# Patient Record
Sex: MALE | Race: OTHER | HISPANIC OR LATINO | Employment: FULL TIME | URBAN - METROPOLITAN AREA
[De-identification: names, ages, dates, MRNs, and addresses within clinical notes are randomized per-mention and may not be internally consistent; named-entity substitution may affect disease eponyms.]

---
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
## 2021-08-16 ENCOUNTER — OFFICE VISIT (OUTPATIENT)
Dept: URGENT CARE | Facility: CLINIC | Age: 21
End: 2021-08-16
Payer: COMMERCIAL

## 2021-08-16 ENCOUNTER — HOSPITAL ENCOUNTER (OUTPATIENT)
Dept: RADIOLOGY | Facility: MEDICAL CENTER | Age: 21
End: 2021-08-16
Attending: PHYSICIAN ASSISTANT
Payer: COMMERCIAL

## 2021-08-16 VITALS
HEART RATE: 84 BPM | WEIGHT: 280 LBS | RESPIRATION RATE: 16 BRPM | SYSTOLIC BLOOD PRESSURE: 122 MMHG | DIASTOLIC BLOOD PRESSURE: 88 MMHG | OXYGEN SATURATION: 97 % | BODY MASS INDEX: 39.2 KG/M2 | TEMPERATURE: 96.8 F | HEIGHT: 71 IN

## 2021-08-16 DIAGNOSIS — S60.151A SUBUNGUAL HEMATOMA OF RIGHT LITTLE FINGER, INITIAL ENCOUNTER: ICD-10-CM

## 2021-08-16 DIAGNOSIS — S67.10XA CRUSHING INJURY OF FINGER OF RIGHT HAND: ICD-10-CM

## 2021-08-16 PROCEDURE — 73140 X-RAY EXAM OF FINGER(S): CPT | Mod: RT

## 2021-08-16 PROCEDURE — 99203 OFFICE O/P NEW LOW 30 MIN: CPT | Mod: 25 | Performed by: PHYSICIAN ASSISTANT

## 2021-08-16 PROCEDURE — 11740 EVACUATION SUBUNGUAL HMTMA: CPT | Performed by: PHYSICIAN ASSISTANT

## 2021-08-16 RX ORDER — ACETAMINOPHEN 500 MG
500-1000 TABLET ORAL EVERY 6 HOURS PRN
COMMUNITY
End: 2022-11-28

## 2021-08-16 RX ORDER — IBUPROFEN 200 MG
200 TABLET ORAL EVERY 6 HOURS PRN
COMMUNITY
End: 2022-09-19

## 2021-08-16 ASSESSMENT — ENCOUNTER SYMPTOMS
NAUSEA: 0
VOMITING: 0
EYE DISCHARGE: 0
SHORTNESS OF BREATH: 0
SORE THROAT: 0
EYE REDNESS: 0
FEVER: 0
COUGH: 0
HEADACHES: 0

## 2021-08-16 NOTE — LETTER
August 16, 2021         Patient: Devan Tobar   YOB: 2000   Date of Visit: 8/16/2021           To Whom it May Concern:    Devan Tobar was seen in my clinic on 8/16/2021. Please excuse the patient from work 8/16/2021 and 8/17/2021.     If you have any questions or concerns, please don't hesitate to call.        Sincerely,           Areli Cardenas P.A.-C.  Electronically Signed

## 2021-08-16 NOTE — LETTER
August 16, 2021         Patient: Devan Tobar   YOB: 2000   Date of Visit: 8/16/2021           To Whom it May Concern:    Devan Tobar was seen in my clinic on 8/16/2021. Please excuse him from work today, 8/16/2021.       If you have any questions or concerns, please don't hesitate to call.        Sincerely,           Areli Cardenas P.A.-C.  Electronically Signed

## 2021-08-16 NOTE — PROGRESS NOTES
"Subjective     Devan Tobar is a 21 y.o. male who presents with Finger Injury ((R) pinky injury from car door couple days ago)            HPI  This is a new problem.  The patient presents to clinic secondary to an injury to the right fifth digit x2 days ago.  The patient states he accidentally jammed his right pinky in a car door x2 days ago.  The patient states he developed pain and swelling to the distal aspect of the right fifth digit.  The patient reports associated bruising underneath the nail.  The patient states he is experiencing a constant \"throbbing\" pain to the distal aspect of the right fifth digit.  The patient reports no open wounds/abrasions.  The patient notes slight decreased range of motion secondary to pain and swelling.  He reports no numbness, tingling, or weakness.  The patient is taken OTC Tylenol for his current symptoms.    PMH:  has a past medical history of Family problems (6/21/2012).  MEDS:   Current Outpatient Medications:   •  acetaminophen (TYLENOL) 500 MG Tab, Take 500-1,000 mg by mouth every 6 hours as needed., Disp: , Rfl:   •  ibuprofen (MOTRIN) 200 MG Tab, Take 200 mg by mouth every 6 hours as needed., Disp: , Rfl:   •  benzonatate (TESSALON) 200 MG capsule, Take 1 Cap by mouth 3 times a day as needed for Cough. (Patient not taking: Reported on 8/16/2021), Disp: 60 Cap, Rfl: 0  •  Pheniramine-PE-APAP (THERAFLU SINUS & COLD PO), Take  by mouth. (Patient not taking: Reported on 8/16/2021), Disp: , Rfl:   •  Pseudoephedrine-APAP-DM (DAYQUIL PO), Take  by mouth. (Patient not taking: Reported on 8/16/2021), Disp: , Rfl:   •  azithromycin (ZITHROMAX) 250 MG Tab, Take two tabs by mouth on day one, then one tab by mouth daily on days 2-5. (Patient not taking: Reported on 8/16/2021), Disp: 6 Tab, Rfl: 0  ALLERGIES: No Known Allergies  SURGHX: No past surgical history on file.  SOCHX:  reports that he has never smoked. He has never used smokeless tobacco. He reports current alcohol " "use. He reports current drug use. Drugs: Inhaled and Marijuana.  FH: Family history was reviewed, no pertinent findings to report      Review of Systems   Constitutional: Negative for fever.   HENT: Negative for congestion, ear pain and sore throat.    Eyes: Negative for discharge and redness.   Respiratory: Negative for cough and shortness of breath.    Cardiovascular: Negative for chest pain and leg swelling.   Gastrointestinal: Negative for nausea and vomiting.   Musculoskeletal: Negative for joint pain.   Skin: Negative for rash.   Neurological: Negative for headaches.            Objective     /88 (BP Location: Left arm, Patient Position: Sitting, BP Cuff Size: Adult long)   Pulse 84   Temp 36 °C (96.8 °F) (Temporal)   Resp 16   Ht 1.803 m (5' 11\")   Wt (!) 127 kg (280 lb)   SpO2 97%   BMI 39.05 kg/m²      Physical Exam  Constitutional:       General: He is not in acute distress.     Appearance: Normal appearance. He is well-developed. He is not ill-appearing.   HENT:      Head: Normocephalic and atraumatic.      Right Ear: External ear normal.      Left Ear: External ear normal.      Nose: Nose normal.   Eyes:      Conjunctiva/sclera: Conjunctivae normal.   Cardiovascular:      Rate and Rhythm: Normal rate.   Pulmonary:      Effort: Pulmonary effort is normal.   Musculoskeletal:      Cervical back: Normal range of motion and neck supple.      Comments:   Right 5th Digit:  Tenderness to the distal aspect of the right fifth digit, including the DIP joint, with trace localized swelling.  No ecchymosis.  No overlying erythema.  No increased warmth.  No open wounds/abrasions.  The patient's fingernail is intact with a subungual hematoma.  Decreased ROM -the patient measured limited range of motion of the distal aspect of the right fifth digit; the patient presented full active range of motion of the PIP joint  Distally neurovascular intact  Strength 5/5 -flexion/extension of the right fifth digit " against resistance   Skin:     General: Skin is warm and dry.   Neurological:      Mental Status: He is alert and oriented to person, place, and time.            Progress:  Right 5th Finger XR:  XRs reviewed by me.     COMPARISON: None     FINDINGS:  No acute fracture or dislocation of the right fifth finger.     IMPRESSION:  No acute fracture identified.       Reviewed x-ray results with the patient in clinic.    Subungual I&D:  Cleansed with the patient's fingernail with an alcohol prep pad.  Used a cautery pen to puncture a single hole in the center of the perineal of the right fifth digit.  Bloody discharge was expressed.  Hemostasis was achieved.  The patient tolerated the procedure well.  Educated the patient on proper wound care.  Advised the patient to monitor for signs of infection.           Assessment & Plan        1. Crushing injury of finger of right hand  - DX-FINGER(S) 2+ RIGHT; Future    2. Subungual hematoma of right little finger, initial encounter    Differential diagnoses, supportive care, and indications for immediate follow-up discussed with patient.   Instructed to return to clinic or nearest emergency department for any change in condition, further concerns, or worsening of symptoms.    OTC NSAIDs for pain/discomfort   RICE  Epson salt soaks  Keep wound clean and dry  Monitor for signs of infection  Follow-up with PCP   Return to clinic or go tot he ED if symptoms worsen or fail to improve, or if the patient should develop worsening/increasing pain/tenderness, swelling, bruising, redness or warmth to the affected area, discharge/drainage, decreased ROM, numbness, tingling or weakness, difficulty walking, fever/chills, and/or any concerning symptoms.     Discussed plan with the patient, and he agrees to the above.    I personally reviewed prior external notes and test results pertinent to today's visit.  I have independently reviewed and interpreted all diagnostics ordered during this urgent  care visit.     Time spent evaluating this patient was at least 30 minutes and includes preparing for visit, obtaining history, exam and evaluation, ordering labs/tests/procedures/medications, independent interpretation, and counseling/education.    Please note that this dictation was created using voice recognition software. I have made every reasonable attempt to correct obvious errors, but I expect that there may be errors of grammar and possibly content that I did not discover before finalizing the note.     This note was electronically signed by Areli Cardenas PA-C

## 2022-08-24 ENCOUNTER — OFFICE VISIT (OUTPATIENT)
Dept: MEDICAL GROUP | Facility: IMAGING CENTER | Age: 22
End: 2022-08-24
Payer: COMMERCIAL

## 2022-08-24 VITALS
WEIGHT: 254.8 LBS | RESPIRATION RATE: 17 BRPM | HEART RATE: 76 BPM | DIASTOLIC BLOOD PRESSURE: 60 MMHG | SYSTOLIC BLOOD PRESSURE: 102 MMHG | HEIGHT: 72 IN | BODY MASS INDEX: 34.51 KG/M2 | OXYGEN SATURATION: 95 % | TEMPERATURE: 97.8 F

## 2022-08-24 DIAGNOSIS — Z13.31 DEPRESSION SCREENING: ICD-10-CM

## 2022-08-24 DIAGNOSIS — F33.0 MILD EPISODE OF RECURRENT MAJOR DEPRESSIVE DISORDER (HCC): ICD-10-CM

## 2022-08-24 DIAGNOSIS — E66.1 CLASS 1 DRUG-INDUCED OBESITY WITH SERIOUS COMORBIDITY AND BODY MASS INDEX (BMI) OF 34.0 TO 34.9 IN ADULT: ICD-10-CM

## 2022-08-24 DIAGNOSIS — L73.2 HIDRADENITIS SUPPURATIVA: ICD-10-CM

## 2022-08-24 DIAGNOSIS — Z11.59 NEED FOR HEPATITIS C SCREENING TEST: ICD-10-CM

## 2022-08-24 DIAGNOSIS — Z11.3 SCREENING EXAMINATION FOR SEXUALLY TRANSMITTED DISEASE: ICD-10-CM

## 2022-08-24 DIAGNOSIS — Z76.89 ENCOUNTER TO ESTABLISH CARE WITH NEW DOCTOR: ICD-10-CM

## 2022-08-24 PROBLEM — F32.A DEPRESSION: Status: ACTIVE | Noted: 2022-08-24

## 2022-08-24 PROCEDURE — 99204 OFFICE O/P NEW MOD 45 MIN: CPT | Performed by: CLINICAL NURSE SPECIALIST

## 2022-08-24 RX ORDER — CLINDAMYCIN PHOSPHATE 11.9 MG/ML
1 SOLUTION TOPICAL 2 TIMES DAILY
Qty: 60 ML | Refills: 0 | Status: SHIPPED | OUTPATIENT
Start: 2022-08-24

## 2022-08-24 ASSESSMENT — PATIENT HEALTH QUESTIONNAIRE - PHQ9
5. POOR APPETITE OR OVEREATING: 1 - SEVERAL DAYS
CLINICAL INTERPRETATION OF PHQ2 SCORE: 1
SUM OF ALL RESPONSES TO PHQ QUESTIONS 1-9: 7

## 2022-08-24 NOTE — ASSESSMENT & PLAN NOTE
Recently started going to the gym.  Diet is unrestricted.  Drinks soda with sugar.  Alcohol occasionally. No cigarettes or vaping.

## 2022-08-24 NOTE — ASSESSMENT & PLAN NOTE
Devan feels like he has had depression of variable intensity since [employer].  He has had thoughts of not being here but reports he would never act on those thoughts and loves life. Interested in therapy as this has never been addressed previously.

## 2022-08-24 NOTE — PROGRESS NOTES
Subjective     Devan Tobar is a 22 y.o. male who presents with Establish Care, Other (Painful in groin hair on face and pelvis), and Referral Needed (To Mental Health)            HPI  Hidradenitis suppurativa  Affects the face and pubic area. Started after using a razor and has persisted on and off for many years.  Areas can become painful and drain blood and pus. He has never had treatment for this. Switched to electric razor and no new lesions.    Depression  Devan feels like he has had depression of variable intensity since [employer].  He has had thoughts of not being here but reports he would never act on those thoughts and loves life. Interested in therapy as this has never been addressed previously.     Class 1 drug-induced obesity with serious comorbidity and body mass index (BMI) of 34.0 to 34.9 in adult  Recently started going to the gym.  Diet is unrestricted.  Drinks soda with sugar.  Alcohol occasionally. No cigarettes or vaping.    ROS  See HPI     No Known Allergies    Current Outpatient Medications on File Prior to Visit   Medication Sig Dispense Refill    acetaminophen (TYLENOL) 500 MG Tab Take 500-1,000 mg by mouth every 6 hours as needed.      ibuprofen (MOTRIN) 200 MG Tab Take 200 mg by mouth every 6 hours as needed.       No current facility-administered medications on file prior to visit.     Depression Screen (PHQ-2/PHQ-9) 1/11/2018 8/24/2022   PHQ-2 Total Score 0 1   PHQ-9 Total Score - 7       Interpretation of PHQ-9 Total Score   Score Severity   1-4 No Depression   5-9 Mild Depression   10-14 Moderate Depression   15-19 Moderately Severe Depression   20-27 Severe Depression        Objective     /60 (BP Location: Right arm, Patient Position: Sitting, BP Cuff Size: Large adult)   Pulse 76   Temp 36.6 °C (97.8 °F) (Temporal)   Resp 17   Ht 1.829 m (6')   Wt 116 kg (254 lb 12.8 oz)   SpO2 95%   BMI 34.56 kg/m²      Physical Exam  Constitutional:        General: He is not in acute distress.     Appearance: He is obese. He is not ill-appearing, toxic-appearing or diaphoretic.   HENT:      Head: Normocephalic and atraumatic.   Eyes:      Pupils: Pupils are equal, round, and reactive to light.   Cardiovascular:      Rate and Rhythm: Normal rate.   Pulmonary:      Effort: Pulmonary effort is normal.   Skin:     General: Skin is warm and dry.      Comments: Multiple open comedones on face with 2 scabbed cystic lesions.  Pubic area with 2 scabbed lesions as well, no drainage or warmth.  Some reddened tissue throughout.   Neurological:      Mental Status: He is alert and oriented to person, place, and time.   Psychiatric:         Mood and Affect: Mood normal.         Behavior: Behavior normal.         Thought Content: Thought content normal.         Judgment: Judgment normal.                           Assessment & Plan      1. Encounter to establish care with new doctor  Social, personal, surgical, family history reviewed. Will address care gaps at future visit.    2. Depression screening  Positive    3. Hidradenitis suppurativa  Apply clindamycin solution twice daily and return for reevaluation in 2-4 weeks.    - clindamycin (CLEOCIN) 1 % Solution; Apply 1 Application topically 2 times a day.  Dispense: 60 mL; Refill: 0    4. Mild episode of recurrent major depressive disorder (HCC)  Referral to behavioral health placed.  Declined medication.    5. Class 1 drug-induced obesity with serious comorbidity and body mass index (BMI) of 34.0 to 34.9 in adult  Continue exercising. Cut out soda.  Future visits will address healthy lifestyle choices further. Labs ordered.    - CBC WITH DIFFERENTIAL; Future  - Comp Metabolic Panel; Future  - HEMOGLOBIN A1C; Future  - Lipid Profile; Future  - TSH WITH REFLEX TO FT4; Future  - VITAMIN D,25 HYDROXY; Future    6. Screening examination for sexually transmitted disease    - T.PALLIDUM AB EIA; Future  - HIV AG/AB COMBO ASSAY SCREENING;  Future  - Chlamydia/GC, PCR (Urine); Future    7. Need for hepatitis C screening test    - HEP C VIRUS ANTIBODY; Future    Return if symptoms worsen or fail to improve, for With test results.

## 2022-08-24 NOTE — ASSESSMENT & PLAN NOTE
Affects the face and pubic area. Started after using a razor and has persisted on and off for many years.  Areas can become painful and drain blood and pus. He has never had treatment for this. Switched to electric razor and no new lesions.

## 2022-08-25 ENCOUNTER — HOSPITAL ENCOUNTER (OUTPATIENT)
Dept: LAB | Facility: MEDICAL CENTER | Age: 22
End: 2022-08-25
Attending: CLINICAL NURSE SPECIALIST
Payer: COMMERCIAL

## 2022-08-25 DIAGNOSIS — Z11.3 SCREENING EXAMINATION FOR SEXUALLY TRANSMITTED DISEASE: ICD-10-CM

## 2022-08-25 DIAGNOSIS — E66.1 CLASS 1 DRUG-INDUCED OBESITY WITH SERIOUS COMORBIDITY AND BODY MASS INDEX (BMI) OF 34.0 TO 34.9 IN ADULT: ICD-10-CM

## 2022-08-25 DIAGNOSIS — Z11.59 NEED FOR HEPATITIS C SCREENING TEST: ICD-10-CM

## 2022-08-25 LAB
25(OH)D3 SERPL-MCNC: 13 NG/ML (ref 30–100)
ALBUMIN SERPL BCP-MCNC: 5 G/DL (ref 3.2–4.9)
ALBUMIN/GLOB SERPL: 1.7 G/DL
ALP SERPL-CCNC: 107 U/L (ref 30–99)
ALT SERPL-CCNC: 17 U/L (ref 2–50)
ANION GAP SERPL CALC-SCNC: 12 MMOL/L (ref 7–16)
AST SERPL-CCNC: 21 U/L (ref 12–45)
BASOPHILS # BLD AUTO: 0.4 % (ref 0–1.8)
BASOPHILS # BLD: 0.02 K/UL (ref 0–0.12)
BILIRUB SERPL-MCNC: 0.6 MG/DL (ref 0.1–1.5)
BUN SERPL-MCNC: 14 MG/DL (ref 8–22)
CALCIUM SERPL-MCNC: 9.8 MG/DL (ref 8.5–10.5)
CHLORIDE SERPL-SCNC: 105 MMOL/L (ref 96–112)
CHOLEST SERPL-MCNC: 190 MG/DL (ref 100–199)
CO2 SERPL-SCNC: 23 MMOL/L (ref 20–33)
CREAT SERPL-MCNC: 0.77 MG/DL (ref 0.5–1.4)
EOSINOPHIL # BLD AUTO: 0.12 K/UL (ref 0–0.51)
EOSINOPHIL NFR BLD: 2.3 % (ref 0–6.9)
ERYTHROCYTE [DISTWIDTH] IN BLOOD BY AUTOMATED COUNT: 42.6 FL (ref 35.9–50)
EST. AVERAGE GLUCOSE BLD GHB EST-MCNC: 111 MG/DL
FASTING STATUS PATIENT QL REPORTED: NORMAL
GFR SERPLBLD CREATININE-BSD FMLA CKD-EPI: 130 ML/MIN/1.73 M 2
GLOBULIN SER CALC-MCNC: 3 G/DL (ref 1.9–3.5)
GLUCOSE SERPL-MCNC: 90 MG/DL (ref 65–99)
HBA1C MFR BLD: 5.5 % (ref 4–5.6)
HCT VFR BLD AUTO: 46.4 % (ref 42–52)
HCV AB SER QL: NORMAL
HDLC SERPL-MCNC: 28 MG/DL
HGB BLD-MCNC: 16.1 G/DL (ref 14–18)
HIV 1+2 AB+HIV1 P24 AG SERPL QL IA: NORMAL
IMM GRANULOCYTES # BLD AUTO: 0.02 K/UL (ref 0–0.11)
IMM GRANULOCYTES NFR BLD AUTO: 0.4 % (ref 0–0.9)
LDLC SERPL CALC-MCNC: 120 MG/DL
LYMPHOCYTES # BLD AUTO: 1.54 K/UL (ref 1–4.8)
LYMPHOCYTES NFR BLD: 30 % (ref 22–41)
MCH RBC QN AUTO: 31.4 PG (ref 27–33)
MCHC RBC AUTO-ENTMCNC: 34.7 G/DL (ref 33.7–35.3)
MCV RBC AUTO: 90.6 FL (ref 81.4–97.8)
MONOCYTES # BLD AUTO: 0.45 K/UL (ref 0–0.85)
MONOCYTES NFR BLD AUTO: 8.8 % (ref 0–13.4)
NEUTROPHILS # BLD AUTO: 2.98 K/UL (ref 1.82–7.42)
NEUTROPHILS NFR BLD: 58.1 % (ref 44–72)
NRBC # BLD AUTO: 0 K/UL
NRBC BLD-RTO: 0 /100 WBC
PLATELET # BLD AUTO: 187 K/UL (ref 164–446)
PMV BLD AUTO: 11.9 FL (ref 9–12.9)
POTASSIUM SERPL-SCNC: 3.9 MMOL/L (ref 3.6–5.5)
PROT SERPL-MCNC: 8 G/DL (ref 6–8.2)
RBC # BLD AUTO: 5.12 M/UL (ref 4.7–6.1)
SODIUM SERPL-SCNC: 140 MMOL/L (ref 135–145)
T PALLIDUM AB SER QL IA: NORMAL
TRIGL SERPL-MCNC: 208 MG/DL (ref 0–149)
TSH SERPL DL<=0.005 MIU/L-ACNC: 0.67 UIU/ML (ref 0.38–5.33)
WBC # BLD AUTO: 5.1 K/UL (ref 4.8–10.8)

## 2022-08-25 PROCEDURE — 86803 HEPATITIS C AB TEST: CPT

## 2022-08-25 PROCEDURE — 82306 VITAMIN D 25 HYDROXY: CPT

## 2022-08-25 PROCEDURE — 83036 HEMOGLOBIN GLYCOSYLATED A1C: CPT

## 2022-08-25 PROCEDURE — 86780 TREPONEMA PALLIDUM: CPT

## 2022-08-25 PROCEDURE — 80061 LIPID PANEL: CPT

## 2022-08-25 PROCEDURE — 80053 COMPREHEN METABOLIC PANEL: CPT

## 2022-08-25 PROCEDURE — 85025 COMPLETE CBC W/AUTO DIFF WBC: CPT

## 2022-08-25 PROCEDURE — 84443 ASSAY THYROID STIM HORMONE: CPT

## 2022-08-25 PROCEDURE — 87389 HIV-1 AG W/HIV-1&-2 AB AG IA: CPT

## 2022-08-25 PROCEDURE — 36415 COLL VENOUS BLD VENIPUNCTURE: CPT

## 2022-08-25 PROCEDURE — 87591 N.GONORRHOEAE DNA AMP PROB: CPT

## 2022-08-25 PROCEDURE — 87491 CHLMYD TRACH DNA AMP PROBE: CPT

## 2022-08-26 LAB
C TRACH DNA SPEC QL NAA+PROBE: NEGATIVE
N GONORRHOEA DNA SPEC QL NAA+PROBE: NEGATIVE
SPECIMEN SOURCE: NORMAL

## 2022-09-19 ENCOUNTER — OFFICE VISIT (OUTPATIENT)
Dept: MEDICAL GROUP | Facility: IMAGING CENTER | Age: 22
End: 2022-09-19
Payer: COMMERCIAL

## 2022-09-19 VITALS
HEIGHT: 72 IN | TEMPERATURE: 96.7 F | BODY MASS INDEX: 34.81 KG/M2 | DIASTOLIC BLOOD PRESSURE: 64 MMHG | SYSTOLIC BLOOD PRESSURE: 110 MMHG | OXYGEN SATURATION: 96 % | HEART RATE: 60 BPM | RESPIRATION RATE: 16 BRPM | WEIGHT: 257 LBS

## 2022-09-19 DIAGNOSIS — Z23 NEED FOR VACCINATION: ICD-10-CM

## 2022-09-19 DIAGNOSIS — L73.2 HIDRADENITIS SUPPURATIVA: ICD-10-CM

## 2022-09-19 DIAGNOSIS — R53.83 FATIGUE, UNSPECIFIED TYPE: ICD-10-CM

## 2022-09-19 DIAGNOSIS — F33.0 MILD EPISODE OF RECURRENT MAJOR DEPRESSIVE DISORDER (HCC): ICD-10-CM

## 2022-09-19 DIAGNOSIS — E55.9 VITAMIN D DEFICIENCY: ICD-10-CM

## 2022-09-19 DIAGNOSIS — E78.5 DYSLIPIDEMIA: ICD-10-CM

## 2022-09-19 PROCEDURE — 90715 TDAP VACCINE 7 YRS/> IM: CPT | Performed by: CLINICAL NURSE SPECIALIST

## 2022-09-19 PROCEDURE — 90471 IMMUNIZATION ADMIN: CPT | Performed by: CLINICAL NURSE SPECIALIST

## 2022-09-19 PROCEDURE — 99214 OFFICE O/P EST MOD 30 MIN: CPT | Mod: 25 | Performed by: CLINICAL NURSE SPECIALIST

## 2022-09-19 RX ORDER — IBUPROFEN 800 MG/1
TABLET ORAL
COMMUNITY
Start: 2022-09-04 | End: 2022-11-28

## 2022-09-19 RX ORDER — ERGOCALCIFEROL 1.25 MG/1
50000 CAPSULE ORAL
Qty: 12 CAPSULE | Refills: 1 | Status: SHIPPED | OUTPATIENT
Start: 2022-09-19 | End: 2022-11-28

## 2022-09-19 ASSESSMENT — PAIN SCALES - GENERAL: PAINLEVEL: NO PAIN

## 2022-09-19 ASSESSMENT — FIBROSIS 4 INDEX: FIB4 SCORE: 0.6

## 2022-09-19 NOTE — PROGRESS NOTES
Subjective     Devan Tobar is a 22 y.o. male who presents with Lab Results (From 08/25/22)            HPI  Hidradenitis suppurativa  Clindamycin 1% solution helping face and groin.  Still having some bumps but not erupting.     Depression  Behavioral health referral completed but practice full. He is on a waiting list.     Fatigue  Reports fatigue for 2 weeks.  No ED.  Interested in testosterone testing. He has low motivation.     ROS  See HPI   No Known Allergies    Current Outpatient Medications on File Prior to Visit   Medication Sig Dispense Refill    acetaminophen (TYLENOL) 500 MG Tab Take 500-1,000 mg by mouth every 6 hours as needed.      ibuprofen (MOTRIN) 800 MG Tab       clindamycin (CLEOCIN) 1 % Solution Apply 1 Application topically 2 times a day. (Patient not taking: Reported on 9/19/2022) 60 mL 0     No current facility-administered medications on file prior to visit.           Objective     /64   Pulse 60   Temp 35.9 °C (96.7 °F) (Temporal)   Resp 16   Ht 1.829 m (6')   Wt 117 kg (257 lb)   SpO2 96%   BMI 34.86 kg/m²      Physical Exam  Constitutional:       General: He is not in acute distress.     Appearance: He is not ill-appearing, toxic-appearing or diaphoretic.   HENT:      Head: Normocephalic and atraumatic.   Eyes:      Pupils: Pupils are equal, round, and reactive to light.   Cardiovascular:      Rate and Rhythm: Normal rate.   Pulmonary:      Effort: Pulmonary effort is normal.   Skin:     General: Skin is warm and dry.   Neurological:      Mental Status: He is alert and oriented to person, place, and time.   Psychiatric:         Mood and Affect: Mood normal.         Behavior: Behavior normal.         Thought Content: Thought content normal.         Judgment: Judgment normal.                           Assessment & Plan      1. Hidradenitis suppurativa  Continue clindamycin treatment.    2. Mild episode of recurrent major depressive disorder (HCC)  Called  referrals dept who will work on finding a clinic that has availability and takes his insurance    3. Vitamin D deficiency  Low at 13.    START 50,000units once weekly. Recheck in 6 months    - vitamin D2, Ergocalciferol, (DRISDOL) 1.25 MG (03119 UT) Cap capsule; Take 1 Capsule by mouth every 7 days.  Dispense: 12 Capsule; Refill: 1  - VITAMIN D,25 HYDROXY (DEFICIENCY); Future    4. Dyslipidemia  Elevated LDL and triglycerides with very low HDL.  Exercise 30 minutes daily focusing on increased heart rate.  Repeat test in 6 months.    - Lipid Profile; Future    5. Fatigue, unspecified type  Likely related to other factors than testosterone but will check to rule out.    - Testosterone, Free & Total, Adult Male (w/SHBG); Future    6. Need for vaccination    - Tdap Vaccine =>8YO IM     Return in about 6 months (around 3/19/2023), or if symptoms worsen or fail to improve.

## 2022-11-23 ENCOUNTER — APPOINTMENT (OUTPATIENT)
Dept: MEDICAL GROUP | Facility: IMAGING CENTER | Age: 22
End: 2022-11-23
Payer: COMMERCIAL

## 2022-11-23 NOTE — PROGRESS NOTES
Subjective     Devan Tobar is a 22 y.o. male who presents with No chief complaint on file.            HPI    ROS  See HPI    No Known Allergies    Current Outpatient Medications on File Prior to Visit   Medication Sig Dispense Refill    ibuprofen (MOTRIN) 800 MG Tab       vitamin D2, Ergocalciferol, (DRISDOL) 1.25 MG (01041 UT) Cap capsule Take 1 Capsule by mouth every 7 days. 12 Capsule 1    clindamycin (CLEOCIN) 1 % Solution Apply 1 Application topically 2 times a day. (Patient not taking: Reported on 9/19/2022) 60 mL 0    acetaminophen (TYLENOL) 500 MG Tab Take 500-1,000 mg by mouth every 6 hours as needed.       No current facility-administered medications on file prior to visit.              Objective     There were no vitals taken for this visit.     Physical Exam                        Assessment & Plan        There are no diagnoses linked to this encounter.      No follow-ups on file.

## 2022-11-28 ENCOUNTER — OFFICE VISIT (OUTPATIENT)
Dept: MEDICAL GROUP | Facility: IMAGING CENTER | Age: 22
End: 2022-11-28
Payer: COMMERCIAL

## 2022-11-28 VITALS
HEART RATE: 70 BPM | BODY MASS INDEX: 35.46 KG/M2 | WEIGHT: 261.8 LBS | OXYGEN SATURATION: 96 % | SYSTOLIC BLOOD PRESSURE: 122 MMHG | DIASTOLIC BLOOD PRESSURE: 76 MMHG | TEMPERATURE: 98.1 F | HEIGHT: 72 IN

## 2022-11-28 DIAGNOSIS — E78.5 DYSLIPIDEMIA: ICD-10-CM

## 2022-11-28 DIAGNOSIS — F33.0 MILD EPISODE OF RECURRENT MAJOR DEPRESSIVE DISORDER (HCC): ICD-10-CM

## 2022-11-28 DIAGNOSIS — E55.9 VITAMIN D DEFICIENCY: ICD-10-CM

## 2022-11-28 DIAGNOSIS — Z23 NEED FOR VACCINATION: ICD-10-CM

## 2022-11-28 PROCEDURE — 99213 OFFICE O/P EST LOW 20 MIN: CPT | Mod: 25 | Performed by: CLINICAL NURSE SPECIALIST

## 2022-11-28 PROCEDURE — 90471 IMMUNIZATION ADMIN: CPT | Performed by: CLINICAL NURSE SPECIALIST

## 2022-11-28 PROCEDURE — 90686 IIV4 VACC NO PRSV 0.5 ML IM: CPT | Performed by: CLINICAL NURSE SPECIALIST

## 2022-11-28 ASSESSMENT — PAIN SCALES - GENERAL: PAINLEVEL: NO PAIN

## 2022-11-28 ASSESSMENT — FIBROSIS 4 INDEX: FIB4 SCORE: 0.6

## 2022-11-28 NOTE — LETTER
November 28, 2022      To Whom It May Concern:    Devan Tobar has had therapeutic benefit since living with his cat.  Please allow Devan Tobar to continue to reside with his cat for his mental health and improved quality of life.      Thank you for your understanding, compassion and support regarding this matter. If you have any questions, please call [phone].    Sincerely,      Naman OSUNA  [facility]  [facility]  [phone]

## 2022-11-28 NOTE — ASSESSMENT & PLAN NOTE
Lang has had a cat named Jamie for about 2 years.  Jamie has helped with feelings of depression, anxiety and loneliness.  Lang is moving to a location that does not allow animals.

## 2022-11-28 NOTE — PROGRESS NOTES
Subjective     Devan Tobar is a 22 y.o. male who presents with Other (Wants to change animal to an emotional support animal)            HPI  Depression  Lagn has had a cat named Jamie for about 2 years.  Jamie has helped with feelings of depression, anxiety and loneliness.  Lang is moving to a location that does not allow animals.    Vitamin D deficiency  Took most of his vitamin D pills and they helped with Lang's energy.  He now works in the sun and does not feel he needs any more.     ROS  See HPI    No Known Allergies    Current Outpatient Medications on File Prior to Visit   Medication Sig Dispense Refill    vitamin D2, Ergocalciferol, (DRISDOL) 1.25 MG (31253 UT) Cap capsule Take 1 Capsule by mouth every 7 days. 12 Capsule 1    ibuprofen (MOTRIN) 800 MG Tab  (Patient not taking: Reported on 11/28/2022)      clindamycin (CLEOCIN) 1 % Solution Apply 1 Application topically 2 times a day. (Patient not taking: Reported on 9/19/2022) 60 mL 0    acetaminophen (TYLENOL) 500 MG Tab Take 500-1,000 mg by mouth every 6 hours as needed. (Patient not taking: Reported on 11/28/2022)       No current facility-administered medications on file prior to visit.              Objective     /76 (BP Location: Left arm, Patient Position: Sitting, BP Cuff Size: Large adult)   Pulse 70   Temp 36.7 °C (98.1 °F) (Temporal)   Ht 1.829 m (6')   Wt 119 kg (261 lb 12.8 oz)   SpO2 96%   BMI 35.51 kg/m²      Physical Exam  Constitutional:       General: He is not in acute distress.     Appearance: He is obese. He is not ill-appearing, toxic-appearing or diaphoretic.   HENT:      Head: Normocephalic and atraumatic.   Eyes:      General: No scleral icterus.     Extraocular Movements: Extraocular movements intact.      Pupils: Pupils are equal, round, and reactive to light.   Cardiovascular:      Rate and Rhythm: Normal rate and regular rhythm.      Heart sounds: Normal heart sounds.   Pulmonary:      Effort:  Pulmonary effort is normal.      Breath sounds: Normal breath sounds.   Skin:     General: Skin is warm and dry.   Neurological:      Mental Status: He is alert and oriented to person, place, and time.   Psychiatric:         Mood and Affect: Mood normal.         Behavior: Behavior normal.         Thought Content: Thought content normal.         Judgment: Judgment normal.                           Assessment & Plan      1. Mild episode of recurrent major depressive disorder (HCC)  Letter for therapy cat written and printed for Devan.    2. Vitamin D deficiency  Recheck vitamin D. OK to transition to OTC vitamin D 2000IU.    - VITAMIN D,25 HYDROXY (DEFICIENCY); Future    3. Dyslipidemia  Devan is feeling better overall. He is also working with his body now so getting more exercise.  Will recheck lipids.    - Lipid Profile; Future    Return if symptoms worsen or fail to improve, for With test results.

## 2022-11-28 NOTE — ASSESSMENT & PLAN NOTE
Took most of his vitamin D pills and they helped with Lang's energy.  He now works in the sun and does not feel he needs any more.